# Patient Record
Sex: MALE | Race: WHITE | NOT HISPANIC OR LATINO | Employment: FULL TIME | URBAN - NONMETROPOLITAN AREA
[De-identification: names, ages, dates, MRNs, and addresses within clinical notes are randomized per-mention and may not be internally consistent; named-entity substitution may affect disease eponyms.]

---

## 2019-02-16 ENCOUNTER — NURSE TRIAGE (OUTPATIENT)
Dept: CALL CENTER | Facility: HOSPITAL | Age: 39
End: 2019-02-16

## 2019-02-16 NOTE — TELEPHONE ENCOUNTER
"Recommend being seen for diagnosis since has been present 3 days.     Reason for Disposition  • Patient wants to be seen    Additional Information  • Negative: Patient sounds very sick or weak to the triager  • Negative: Sores on the eye, eyelids or tip of nose  • Negative: Red streak or red area spreading from the cold sore  • Negative: Weak immune system (e.g., HIV positive, cancer chemo, splenectomy, organ transplant, chronic steroids)  • Negative: New sores occur in another area  • Negative: Sores last > 2 weeks    Answer Assessment - Initial Assessment Questions  1. APPEARANCE of BLISTERS: \"Describe the sores.\"      Blisters 2 on outer lip, inside mouth has one.   2. SIZE: \"How large an area is involved with the cold sores?\" (e.g., inches, cm or compare to coins)      Small blisters, cankers  3. LOCATION: \"Which part of the lip is involved?\"      Outer lips, inside lips  4. ONSET: \"When did the fever blisters begin?\"      3 days  5. RECURRENT BLISTERS: \"Have you had fever blisters before?\" If so, ask: \"When was the last time?\" \"How many times a year?\"      Yes  6. OTHER SYMPTOMS: \"Do you have any other symptoms?\" (e.g., fever, sores inside mouth)      Hands, feet have blisters present  7. PREGNANCY: \"Is there any chance you are pregnant?\" \"When was your last menstrual period?\"      NA    Protocols used: COLD SORES - FEVER BLISTERS OF LIP-ADULT-[place]      "

## 2019-02-17 ENCOUNTER — TRANSCRIBE ORDERS (OUTPATIENT)
Dept: ADMINISTRATIVE | Facility: HOSPITAL | Age: 39
End: 2019-02-17

## 2019-02-17 ENCOUNTER — APPOINTMENT (OUTPATIENT)
Dept: LAB | Facility: HOSPITAL | Age: 39
End: 2019-02-17

## 2019-02-17 DIAGNOSIS — R21 RASH AND OTHER NONSPECIFIC SKIN ERUPTION: ICD-10-CM

## 2019-02-17 DIAGNOSIS — B00.9 HSV-1 (HERPES SIMPLEX VIRUS 1) INFECTION: Primary | ICD-10-CM

## 2019-02-17 LAB
BASOPHILS # BLD AUTO: 0.06 10*3/MM3 (ref 0–0.2)
BASOPHILS NFR BLD AUTO: 0.8 % (ref 0–2)
DEPRECATED RDW RBC AUTO: 39.1 FL (ref 40–54)
EOSINOPHIL # BLD AUTO: 0.22 10*3/MM3 (ref 0–0.7)
EOSINOPHIL NFR BLD AUTO: 2.8 % (ref 0–4)
ERYTHROCYTE [DISTWIDTH] IN BLOOD BY AUTOMATED COUNT: 12.3 % (ref 12–15)
ERYTHROCYTE [SEDIMENTATION RATE] IN BLOOD: 3 MM/HR (ref 0–15)
HCT VFR BLD AUTO: 49.9 % (ref 40–52)
HGB BLD-MCNC: 17.9 G/DL (ref 14–18)
IMM GRANULOCYTES # BLD AUTO: 0.03 10*3/MM3 (ref 0–0.05)
IMM GRANULOCYTES NFR BLD AUTO: 0.4 % (ref 0–5)
LYMPHOCYTES # BLD AUTO: 1.59 10*3/MM3 (ref 0.72–4.86)
LYMPHOCYTES NFR BLD AUTO: 20 % (ref 15–45)
MCH RBC QN AUTO: 31.3 PG (ref 28–32)
MCHC RBC AUTO-ENTMCNC: 35.9 G/DL (ref 33–36)
MCV RBC AUTO: 87.2 FL (ref 82–95)
MONOCYTES # BLD AUTO: 0.74 10*3/MM3 (ref 0.19–1.3)
MONOCYTES NFR BLD AUTO: 9.3 % (ref 4–12)
NEUTROPHILS # BLD AUTO: 5.31 10*3/MM3 (ref 1.87–8.4)
NEUTROPHILS NFR BLD AUTO: 66.7 % (ref 39–78)
NRBC BLD AUTO-RTO: 0 /100 WBC (ref 0–0)
PLATELET # BLD AUTO: 231 10*3/MM3 (ref 130–400)
PMV BLD AUTO: 9.8 FL (ref 6–12)
RBC # BLD AUTO: 5.72 10*6/MM3 (ref 4.8–5.9)
WBC NRBC COR # BLD: 7.95 10*3/MM3 (ref 4.8–10.8)

## 2019-02-17 PROCEDURE — 36415 COLL VENOUS BLD VENIPUNCTURE: CPT

## 2019-02-17 PROCEDURE — 86592 SYPHILIS TEST NON-TREP QUAL: CPT | Performed by: NURSE PRACTITIONER

## 2019-02-17 PROCEDURE — 85651 RBC SED RATE NONAUTOMATED: CPT | Performed by: NURSE PRACTITIONER

## 2019-02-17 PROCEDURE — 86695 HERPES SIMPLEX TYPE 1 TEST: CPT | Performed by: NURSE PRACTITIONER

## 2019-02-17 PROCEDURE — 87591 N.GONORRHOEAE DNA AMP PROB: CPT | Performed by: NURSE PRACTITIONER

## 2019-02-17 PROCEDURE — 86696 HERPES SIMPLEX TYPE 2 TEST: CPT | Performed by: NURSE PRACTITIONER

## 2019-02-17 PROCEDURE — 87661 TRICHOMONAS VAGINALIS AMPLIF: CPT | Performed by: NURSE PRACTITIONER

## 2019-02-17 PROCEDURE — 87491 CHLMYD TRACH DNA AMP PROBE: CPT | Performed by: NURSE PRACTITIONER

## 2019-02-17 PROCEDURE — 85025 COMPLETE CBC W/AUTO DIFF WBC: CPT | Performed by: NURSE PRACTITIONER

## 2019-02-19 LAB
C TRACH RRNA SPEC DONR QL NAA+PROBE: NEGATIVE
GNRH SERPL-MCNC: NEGATIVE
HSV1 IGG SER IA-ACNC: 46.6 INDEX (ref 0–0.9)
HSV2 IGG SER IA-ACNC: <0.91 INDEX (ref 0–0.9)
RPR SER QL: NON REACTIVE
T VAGINALIS RRNA GENITAL QL PROBE: NEGATIVE

## 2019-02-21 ENCOUNTER — NURSE TRIAGE (OUTPATIENT)
Dept: CALL CENTER | Facility: HOSPITAL | Age: 39
End: 2019-02-21

## 2019-02-21 NOTE — TELEPHONE ENCOUNTER
"Patient is being treated for HSV type 1. Mouth is very sore and having difficulty finding things besides water than he can drink. I checked and he does have the lidocaine mouthwash and instructed to use it for pain control. Try milk, ice cream, pudding, shakes. Avoid salty, citrus and acidic foods and drinks. If he is not improving after 7 to 10 days, then need to go back to the doctor. If becomes dehydrated (no urine output in > 8 hours) then need to go to ER.     Reason for Disposition  • Cold sores without complications    Additional Information  • Negative: Patient sounds very sick or weak to the triager  • Negative: Sores on the eye, eyelids or tip of nose  • Negative: Red streak or red area spreading from the cold sore  • Negative: Weak immune system (e.g., HIV positive, cancer chemo, splenectomy, organ transplant, chronic steroids)  • Negative: New sores occur in another area  • Negative: Sores last > 2 weeks  • Negative: Patient wants to be seen  • Negative: Herpes sores are a recurrent problem, and caller wants a prescription medicine to take the next time they occur    Answer Assessment - Initial Assessment Questions  1. APPEARANCE of BLISTERS: \"Describe the sores.\"      Blisters  2. SIZE: \"How large an area is involved with the cold sores?\" (e.g., inches, cm or compare to coins)      2 to 3 mm size  3. LOCATION: \"Which part of the lip is involved?\"      Inside mouth, inside lips  4. ONSET: \"When did the fever blisters begin?\"      About 2/13/19 onset  5. RECURRENT BLISTERS: \"Have you had fever blisters before?\" If so, ask: \"When was the last time?\" \"How many times a year?\"      Has had them before, a year ago but never this bad  6. OTHER SYMPTOMS: \"Do you have any other symptoms?\" (e.g., fever, sores inside mouth)      Rash on palms and feet, was not hand foot mouth and is clearing up. Taking antiviral medications  7. PREGNANCY: \"Is there any chance you are pregnant?\" \"When was your last menstrual " "period?\"      NA    Protocols used: COLD SORES - FEVER BLISTERS OF LIP-ADULT-[place]      "

## 2019-02-22 ENCOUNTER — NURSE TRIAGE (OUTPATIENT)
Dept: CALL CENTER | Facility: HOSPITAL | Age: 39
End: 2019-02-22

## 2019-02-22 PROCEDURE — 99282 EMERGENCY DEPT VISIT SF MDM: CPT

## 2019-02-22 RX ORDER — ACYCLOVIR 400 MG/1
400 TABLET ORAL DAILY
Refills: 1 | COMMUNITY
Start: 2019-02-17

## 2019-02-23 ENCOUNTER — HOSPITAL ENCOUNTER (EMERGENCY)
Facility: HOSPITAL | Age: 39
Discharge: HOME | End: 2019-02-23
Admitting: EMERGENCY MEDICINE

## 2019-02-23 VITALS
HEIGHT: 73 IN | SYSTOLIC BLOOD PRESSURE: 126 MMHG | DIASTOLIC BLOOD PRESSURE: 77 MMHG | WEIGHT: 200 LBS | BODY MASS INDEX: 26.51 KG/M2 | TEMPERATURE: 98.3 F | RESPIRATION RATE: 16 BRPM | HEART RATE: 76 BPM | OXYGEN SATURATION: 100 %

## 2019-02-23 DIAGNOSIS — L51.9 ERYTHEMA MULTIFORME: ICD-10-CM

## 2019-02-23 DIAGNOSIS — B00.9 HERPES SIMPLEX: Primary | ICD-10-CM

## 2019-02-23 NOTE — TELEPHONE ENCOUNTER
"Reviewed guideline with caller, guideline advises Mr. Butterfield be seen in ED. Caller agrees to follow care advice.     Reason for Disposition  • Generalized rash on body    Additional Information  • Negative: [1] Looks like fever blisters (\"cold sore\") AND [2] only on outer lip  • Negative: Generalized skin rash from Chickenpox  • Negative: Chemical in the mouth suspected cause of ulcers  • Negative: [1] Drinking very little AND [2] dehydration suspected (e.g., no urine > 12 hours, very dry mouth, very lightheaded)    Answer Assessment - Initial Assessment Questions  1. LOCATION: \"Where is the ulcer located?\"       Lips, sides of cheeks, gums  2. NUMBER: \"How many ulcers are there?\"       10  3. SIZE: \"How large is the ulcer?\"       Largest is larger than a pencil eraser  4. SEVERITY: \"Are they painful?\" If so, ask: \"How bad is it?\"  (Scale 1-10; or mild, moderate, severe)   - MILD - eating  and drinking normally    - MODERATE - decreased liquid intake    - SEVERE - drinking very little       severe  5. ONSET: \"When did you first notice the ulcer?\"       February 1st  6. RECURRENT SYMPTOM: \"Have you had a mouth ulcer before?\" If so, ask: \"When was the last time?\" and \"What happened that time?\"       Yes, last spring, Doesn't remember,   7. CAUSE: \"What do you think is causing the mouth ulcer?\"      Viral infection  8. OTHER SYMPTOMS: \"Do you have any other symptoms?\" (e.g., fever)      No fever, rash on palms and feet  9. PREGNANCY: \"Is there any chance you are pregnant?\" \"When was your last menstrual period?\"      no    Protocols used: MOUTH ULCERS-ADULT-AH      "

## 2019-02-23 NOTE — ED PROVIDER NOTES
Subjective   38-year-old  male presents to the emergency department with mouth sores and hand rash.  Patient reports being diagnosed with HSV 1 herpes simplex January 29 and subsequently has developed target lesion on his hands.  Patient does report worsening of oral lesions.  He has previously been prescribed miracle mouthwash, acyclovir.  Patient has seen a primary care provider and a dermatologist for this and has been diagnosed with HSV with erythema multiforme.  Patient states symptoms have been constant without any change of improvement.  Patient denies fever, nausea, vomiting, urinary issues.        History provided by:  Patient   used: No        Review of Systems   Constitutional: Negative for chills, diaphoresis, fatigue and fever.   HENT: Positive for mouth sores. Negative for congestion and trouble swallowing.    Respiratory: Negative for shortness of breath and wheezing.    Cardiovascular: Negative for chest pain and palpitations.   Gastrointestinal: Negative for abdominal pain, diarrhea and nausea.   Genitourinary: Negative for dysuria.   Musculoskeletal: Negative for arthralgias and myalgias.   Skin: Positive for rash.   Neurological: Negative for dizziness and numbness.   Hematological: Negative for adenopathy. Does not bruise/bleed easily.       No past medical history on file.    No Known Allergies    No past surgical history on file.    No family history on file.    Social History     Socioeconomic History   • Marital status:      Spouse name: Not on file   • Number of children: Not on file   • Years of education: Not on file   • Highest education level: Not on file       Lab Results (last 24 hours)     ** No results found for the last 24 hours. **          Objective   Physical Exam   Constitutional: He is oriented to person, place, and time. He appears well-developed and well-nourished. No distress.   HENT:   Head: Normocephalic and atraumatic.   Right Ear:  "External ear normal.   Left Ear: External ear normal.   Mouth/Throat: Oral lesions present. No posterior oropharyngeal erythema or tonsillar abscesses.       Herpetic HSV lesions of the upper and lower lips.  No acute signs of infection.  No obvious drainage or red streaking.  Able to tolerate saliva well.   Eyes: Conjunctivae and EOM are normal. Pupils are equal, round, and reactive to light. Right eye exhibits no discharge. Left eye exhibits no discharge. No scleral icterus.   Neck: Normal range of motion. Neck supple. No tracheal deviation present. No thyromegaly present.   Cardiovascular: Normal rate, regular rhythm, normal heart sounds and intact distal pulses. Exam reveals no friction rub.   No murmur heard.  Pulmonary/Chest: Effort normal and breath sounds normal. No respiratory distress. He has no wheezes.   Abdominal: Soft. Bowel sounds are normal. He exhibits no distension. There is no tenderness. There is no guarding.   Neurological: He is alert and oriented to person, place, and time.   Skin: Skin is warm and dry. Capillary refill takes less than 2 seconds. He is not diaphoretic.   Rash consistent with erythema multiforme on hands.   Psychiatric: He has a normal mood and affect. His behavior is normal.   Nursing note and vitals reviewed.      Procedures         No orders to display       /77 (BP Location: Right arm, Patient Position: Sitting)   Pulse 76   Temp 98.3 °F (36.8 °C) (Oral)   Resp 16   Ht 185.4 cm (73\")   Wt 90.7 kg (200 lb)   SpO2 100%   BMI 26.39 kg/m²     ED Course         Medications - No data to display         MDM  Number of Diagnoses or Management Options  Erythema multiforme:   Herpes simplex:   Diagnosis management comments: This is a 38-year-old  male with previously diagnosed herpes simplex 1 with erythema multiforme.  Patient has seen a primary care provider and a dermatologist for the symptoms.  He has been placed on valacyclovir and miracle mouthwash to help " with mouth pain.  On examination here no signs of an acute or worsening infection on top of the herpetic lesions of the lips.  Patient does have characteristic erythema multiforme lesions on the hands.  Upon chart review he has positive HSV-1 testing.  He complains of no urinary symptoms or other skin sloughing.  I did offer steroid taper pack but discussed risk and benefits of this with the patient.  He declined wanting steroids secondary to possible rebound.  Overall patient appears well with moderate symptoms.  No acute distress with normal vital signs.  No indications for further workup at this time.  Patient is reassured and agrees with plan to continue.  Thoroughly discussed length of symptoms may last up to 6 weeks.  Informed of infectious symptoms to look for.  At time of discharge no acute distress stable vital signs.  Very strict return precautions given and showing current follow-up with primary care and dermatologist.  Patient is agreeable to plan.    Risk of Complications, Morbidity, and/or Mortality  Presenting problems: low  Diagnostic procedures: low  Management options: low        Final diagnoses:   Herpes simplex   Erythema multiforme          Edy Amaral PA-C  02/23/19 0321

## 2020-09-15 NOTE — PROGRESS NOTES
Subjective    Mr. Butterfield is 39 y.o. male    Chief Complaint: Vasectomy Consult    History of Present Illness     The patient has been pondering the option of a vasectomy for>5 months. Anatomically this is a lower genital tract issue/procedure. With regard to context of the decision, he presently has 4 children. He is . Associated/Relevant symptoms/signs include None. He voices no additional questions about birth control options.       The patient has been pondering the option of a vasectomy for>5 months. Anatomically this is a lower genital tract issue/procedure. With regard to context of the decision, he presently has 4 children. He is . Associated/Relevant symptoms/signs include None. He voices no additional questions about birth control options.     The following portions of the patient's history were reviewed and updated as appropriate: allergies, current medications, past family history, past medical history, past social history, past surgical history and problem list.    Review of Systems   Constitutional: Negative for appetite change and fever.   HENT: Negative for hearing loss and sore throat.    Eyes: Negative for pain and redness.   Respiratory: Negative for cough and shortness of breath.    Cardiovascular: Negative for chest pain and leg swelling.   Gastrointestinal: Negative for anal bleeding, nausea and vomiting.   Endocrine: Negative for cold intolerance and heat intolerance.   Genitourinary: Negative for dysuria, flank pain, frequency, hematuria and urgency.   Musculoskeletal: Negative for joint swelling and myalgias.   Skin: Negative for color change and rash.   Allergic/Immunologic: Negative for immunocompromised state.   Neurological: Negative for dizziness and speech difficulty.   Hematological: Negative for adenopathy. Does not bruise/bleed easily.   Psychiatric/Behavioral: Negative for dysphoric mood and suicidal ideas.         Current Outpatient Medications:   •  acyclovir  "(ZOVIRAX) 400 MG tablet, Take 400 mg by mouth Daily., Disp: , Rfl: 1  •  ALPRAZolam (Xanax) 2 MG tablet, Take 30 minutes prior to procedure, Disp: 1 tablet, Rfl: 0  •  HYDROcodone-acetaminophen (NORCO) 7.5-325 MG per tablet, Take 1 tablet by mouth Every 6 (Six) Hours As Needed for Moderate Pain ., Disp: 12 tablet, Rfl: 0    History reviewed. No pertinent past medical history.    History reviewed. No pertinent surgical history.    Social History     Socioeconomic History   • Marital status:      Spouse name: Not on file   • Number of children: Not on file   • Years of education: Not on file   • Highest education level: Not on file   Tobacco Use   • Smoking status: Never Smoker   • Smokeless tobacco: Never Used   Substance and Sexual Activity   • Alcohol use: Yes   • Drug use: Never   • Sexual activity: Yes     Partners: Female       History reviewed. No pertinent family history.    Objective    Temp 97.2 °F (36.2 °C) (Temporal)   Ht 185.4 cm (73\")   Wt 80.2 kg (176 lb 12.8 oz)   BMI 23.33 kg/m²     Physical Exam  Vitals signs reviewed.   Constitutional:       General: He is not in acute distress.     Appearance: He is well-developed. He is not diaphoretic.   HENT:      Nose: Nose normal.   Neck:      Musculoskeletal: Normal range of motion and neck supple.      Thyroid: No thyromegaly.      Trachea: No tracheal deviation.   Cardiovascular:      Rate and Rhythm: Normal rate and regular rhythm.      Comments: No significant edema or tenderness   Pulmonary:      Effort: No accessory muscle usage or respiratory distress.      Breath sounds: Normal breath sounds.   Abdominal:      General: Bowel sounds are normal. There is no distension.      Palpations: Abdomen is soft. There is no mass.      Tenderness: There is no abdominal tenderness. There is no guarding or rebound.      Hernia: No hernia is present.      Comments: Stool specimen is not indicated for my portion of the exam   Genitourinary:     Penis: " Normal. No tenderness (no lesion or deformities).       Scrotum/Testes: Normal.         Right: Mass, tenderness or swelling not present.         Left: Mass, tenderness or swelling not present.      Prostate: Tender: no nodules.      Rectum: Guaiac result negative. No mass or tenderness. Normal anal tone.      Comments:  The urethral meatus normal in position without evidence of stricture. Epididymis without mass or tenderness. Vas Deferens is palpably normal.    Lymphadenopathy:      Cervical: No cervical adenopathy.      Lower Body: No right inguinal adenopathy. No left inguinal adenopathy.   Skin:     General: Skin is warm and dry.      Coloration: Skin is not pale.      Findings: No rash.   Neurological:      Mental Status: He is alert and oriented to person, place, and time.   Psychiatric:         Behavior: Behavior normal.             Results for orders placed or performed in visit on 02/17/19   Chlamydia trachomatis, Neisseria gonorrhoeae, Trichomonas vaginalis, PCR - Swab, Urine, Clean Catch    Specimen: Urine, Clean Catch; Swab   Result Value Ref Range    Chlamydia trachomatis, MARISELA Negative Negative    Gonococcus by MARISELA Negative Negative    Trichomonas vaginosis Negative Negative   RPR, Rfx Qn RPR / Confirm TP    Specimen: Blood   Result Value Ref Range    RPR Non Reactive Non Reactive   Sedimentation Rate    Specimen: Blood   Result Value Ref Range    Sed Rate 3 0 - 15 mm/hr   HSV 1 & 2 - Specific Antibody, IgG    Specimen: Blood   Result Value Ref Range    HSV 1 IgG, Type Specific 46.60 (H) 0.00 - 0.90 index    HSV 2 IgG <0.91 0.00 - 0.90 index   CBC Auto Differential    Specimen: Blood   Result Value Ref Range    WBC 7.95 4.80 - 10.80 10*3/mm3    RBC 5.72 4.80 - 5.90 10*6/mm3    Hemoglobin 17.9 14.0 - 18.0 g/dL    Hematocrit 49.9 40.0 - 52.0 %    MCV 87.2 82.0 - 95.0 fL    MCH 31.3 28.0 - 32.0 pg    MCHC 35.9 33.0 - 36.0 g/dL    RDW 12.3 12.0 - 15.0 %    RDW-SD 39.1 (L) 40.0 - 54.0 fl    MPV 9.8 6.0 -  12.0 fL    Platelets 231 130 - 400 10*3/mm3    Neutrophil % 66.7 39.0 - 78.0 %    Lymphocyte % 20.0 15.0 - 45.0 %    Monocyte % 9.3 4.0 - 12.0 %    Eosinophil % 2.8 0.0 - 4.0 %    Basophil % 0.8 0.0 - 2.0 %    Immature Grans % 0.4 0.0 - 5.0 %    Neutrophils, Absolute 5.31 1.87 - 8.40 10*3/mm3    Lymphocytes, Absolute 1.59 0.72 - 4.86 10*3/mm3    Monocytes, Absolute 0.74 0.19 - 1.30 10*3/mm3    Eosinophils, Absolute 0.22 0.00 - 0.70 10*3/mm3    Basophils, Absolute 0.06 0.00 - 0.20 10*3/mm3    Immature Grans, Absolute 0.03 0.00 - 0.05 10*3/mm3    nRBC 0.0 0.0 - 0.0 /100 WBC     Assessment and Plan    Diagnoses and all orders for this visit:    Vasectomy evaluation  -     HYDROcodone-acetaminophen (NORCO) 7.5-325 MG per tablet; Take 1 tablet by mouth Every 6 (Six) Hours As Needed for Moderate Pain .  -     Vasectomy; Future  -     ALPRAZolam (Xanax) 2 MG tablet; Take 30 minutes prior to procedure      He was given the consent form, pre-vasectomy instruction sheet, and vasectomy booklet. I extensively reviewed with him the likely postoperative recuperative period as well as the need to continue to use contraception until he is notified by us of his sterility. He will have a semen analysis after 20-30 ejaculations. He understands the potential side effects of local anesthesia, bleeding, scrotal hematoma, wound infection, epididymal orchitis, epididymal congestion,  1% risk chronic testicular pain potentially requiring further surgery, sperm granuloma, antisperm antibodies, early recanalization, spontaneous recanalization with pregnancy after demonstration of azoospermia risk of 1 in 2000 and the possible association with prostate cancer. He is aware of alternatives to vasectomy. He has given this careful consideration and wishes to proceed with a vasectomy.

## 2020-09-18 ENCOUNTER — OFFICE VISIT (OUTPATIENT)
Dept: UROLOGY | Facility: CLINIC | Age: 40
End: 2020-09-18

## 2020-09-18 VITALS — BODY MASS INDEX: 23.43 KG/M2 | HEIGHT: 73 IN | WEIGHT: 176.8 LBS | TEMPERATURE: 97.2 F

## 2020-09-18 DIAGNOSIS — Z30.09 VASECTOMY EVALUATION: Primary | ICD-10-CM

## 2020-09-18 PROCEDURE — 99203 OFFICE O/P NEW LOW 30 MIN: CPT | Performed by: UROLOGY

## 2020-09-18 RX ORDER — ALPRAZOLAM 2 MG/1
TABLET ORAL
Qty: 1 TABLET | Refills: 0 | Status: SHIPPED | OUTPATIENT
Start: 2020-09-18

## 2020-09-18 RX ORDER — HYDROCODONE BITARTRATE AND ACETAMINOPHEN 7.5; 325 MG/1; MG/1
1 TABLET ORAL EVERY 6 HOURS PRN
Qty: 12 TABLET | Refills: 0 | Status: SHIPPED | OUTPATIENT
Start: 2020-09-18

## 2020-11-19 ENCOUNTER — OFFICE VISIT (OUTPATIENT)
Dept: UROLOGY | Facility: CLINIC | Age: 40
End: 2020-11-19

## 2020-11-19 DIAGNOSIS — Z30.2 ENCOUNTER FOR VASECTOMY: ICD-10-CM

## 2020-11-19 PROCEDURE — 55250 REMOVAL OF SPERM DUCT(S): CPT | Performed by: UROLOGY

## 2020-11-19 NOTE — PROGRESS NOTES
No Scalpel Vasectomy Procedure Note    Indications: 39 y.o. male desiring permanent sterilization    Pre-operative Diagnosis: Undesired fertility    Post-operative Diagnosis: Undesired fertility    Anesthesia: Lidocaine 1% without epinephrine     Procedure Details     The risks and benefits of the procedure were discussed at the pre-procedure consultation, and written, informed consent obtained.    Premedicated with Norco 7.5 and Valium 10 mg 30 minutes prior to procedure.    The scrotum was palpated with both testes normal in size and position, no masses palpated. The scrotum was cleansed with warm Betadine and draped in the usual sterile manner.     A vasal sheath block was performed on both the left and right vas.  After adequate anesthesia was established, a small perforation was made in the skin and the right vas was isolated with the ring forceps, dissected free and delivered through the skin perforation.  The right vas was divided, approximately 3 cm portion removed, and each end of the vas was cauterized.  The ends of the vas were replaced in the scrotum through the puncture site.  The left vas was then isolated, divided, cauterized in a similar fashion.  Midportions removed not sent to pathology to confirm because they were grossly normal.     Any bleeding was controlled with electrocautery.  3.0 Chormic interrupted suture was used to close both sites. The puncture site was dry when the procedure was completed. Dressing was applied to both incisions and jock strap placed for scrotal support.    Specimen: None    Condition: Stable    Complications: None    Plan:  1. Continue contraception until negative sperm analysis. Bring 2 semen samples after 20-30 ejaculates  2. Warning signs of infection were reviewed.   3. Patient is taken home by significant other with written home care instructions.  • Bedrest X 48 hrs, Ice pack every 3 hours for 24 hrs.    • Call the clinic if excessive pain, bleeding or  swelling.

## 2021-03-01 ENCOUNTER — LAB (OUTPATIENT)
Dept: LAB | Facility: HOSPITAL | Age: 41
End: 2021-03-01

## 2021-03-01 DIAGNOSIS — Z30.2 ENCOUNTER FOR VASECTOMY: ICD-10-CM

## 2021-03-01 LAB
MOTILITY - POST VASECTOMY: ABNORMAL
SPERM - POST VASECTOMY: ABNORMAL

## 2021-03-01 PROCEDURE — 89321 SEMEN ANAL SPERM DETECTION: CPT

## 2021-03-01 NOTE — DISCHARGE INSTRUCTIONS
Please continue to use your current prescribed medications including her mouthwash, antiviral medications.  You were offered steroids but at this time we chose to not go forward with those.  Return to the ER if he developed any new, worsening or other concerning symptoms such as fever, chills, inability to tolerate food and fluids or any of the symptoms listed below:    You have eye pain, or you have redness or drainage in your eye.  You develop blisters on your skin.  You have difficulty breathing.  You have difficulty swallowing, or you start drooling.  You have blood in your urine.  You have pain with urination.   [facility] 

## 2021-05-06 ENCOUNTER — TELEPHONE (OUTPATIENT)
Dept: UROLOGY | Facility: CLINIC | Age: 41
End: 2021-05-06

## 2021-05-06 ENCOUNTER — LAB (OUTPATIENT)
Dept: LAB | Facility: HOSPITAL | Age: 41
End: 2021-05-06

## 2021-05-06 DIAGNOSIS — Z30.2 ENCOUNTER FOR VASECTOMY: Primary | ICD-10-CM

## 2021-05-06 DIAGNOSIS — Z30.2 ENCOUNTER FOR VASECTOMY: ICD-10-CM

## 2021-05-06 LAB — SPERM - POST VASECTOMY: NORMAL

## 2021-05-06 PROCEDURE — 89321 SEMEN ANAL SPERM DETECTION: CPT
